# Patient Record
Sex: MALE | Race: WHITE | NOT HISPANIC OR LATINO | URBAN - METROPOLITAN AREA
[De-identification: names, ages, dates, MRNs, and addresses within clinical notes are randomized per-mention and may not be internally consistent; named-entity substitution may affect disease eponyms.]

---

## 2019-01-02 ENCOUNTER — OFFICE VISIT (OUTPATIENT)
Dept: URBAN - METROPOLITAN AREA CLINIC 32 | Facility: CLINIC | Age: 74
End: 2019-01-02
Payer: MEDICARE

## 2019-01-02 DIAGNOSIS — H40.1232 LOW-TENSION GLAUCOMA, BILATERAL, MODERATE STAGE: Primary | ICD-10-CM

## 2019-01-02 PROCEDURE — 92133 CPTRZD OPH DX IMG PST SGM ON: CPT | Performed by: OPTOMETRIST

## 2019-01-02 PROCEDURE — 99214 OFFICE O/P EST MOD 30 MIN: CPT | Performed by: OPTOMETRIST

## 2019-01-02 ASSESSMENT — INTRAOCULAR PRESSURE
OD: 10
OS: 13

## 2019-01-02 NOTE — IMPRESSION/PLAN
Impression: Diagnosis: Low-tension glaucoma, bilateral, moderate stage. Code: R34.2940. Plan: Intraocular pressure well controlled, tolerating medications. Will continue with same regimen.  OCT 72/70, enlarged blind spot OD, early arcuate defect OS stable

## 2020-01-28 ENCOUNTER — OFFICE VISIT (OUTPATIENT)
Dept: URBAN - METROPOLITAN AREA CLINIC 32 | Facility: CLINIC | Age: 75
End: 2020-01-28
Payer: MEDICARE

## 2020-01-28 PROCEDURE — 92014 COMPRE OPH EXAM EST PT 1/>: CPT | Performed by: OPTOMETRIST

## 2020-01-28 ASSESSMENT — INTRAOCULAR PRESSURE
OD: 12
OS: 12

## 2020-01-28 ASSESSMENT — KERATOMETRY
OD: 41.50
OS: 42.00

## 2020-01-28 NOTE — IMPRESSION/PLAN
Impression: Diagnosis: Low-tension glaucoma, bilateral, moderate stage. Code: H51.0704. Plan: Intraocular pressure well controlled, tolerating medications. Will continue with same regimen.  OCT 71/70, enlarged blind spot OD, early arcuate defect OS stable

## 2021-02-11 ENCOUNTER — OFFICE VISIT (OUTPATIENT)
Dept: URBAN - METROPOLITAN AREA CLINIC 32 | Facility: CLINIC | Age: 76
End: 2021-02-11
Payer: MEDICARE

## 2021-02-11 DIAGNOSIS — H25.11 AGE-RELATED NUCLEAR CATARACT, RIGHT EYE: ICD-10-CM

## 2021-02-11 PROCEDURE — 99214 OFFICE O/P EST MOD 30 MIN: CPT | Performed by: OPTOMETRIST

## 2021-02-11 ASSESSMENT — INTRAOCULAR PRESSURE
OD: 12
OS: 12

## 2021-02-11 ASSESSMENT — KERATOMETRY
OD: 40.75
OS: 42.13

## 2022-03-25 ENCOUNTER — OFFICE VISIT (OUTPATIENT)
Dept: URBAN - METROPOLITAN AREA CLINIC 32 | Facility: CLINIC | Age: 77
End: 2022-03-25
Payer: MEDICARE

## 2022-03-25 DIAGNOSIS — H52.03 HYPERMETROPIA, BILATERAL: ICD-10-CM

## 2022-03-25 PROCEDURE — 99213 OFFICE O/P EST LOW 20 MIN: CPT | Performed by: OPTOMETRIST

## 2022-03-25 ASSESSMENT — INTRAOCULAR PRESSURE
OS: 16
OD: 14

## 2022-03-25 ASSESSMENT — VISUAL ACUITY
OD: 20/20
OS: 20/20

## 2022-03-25 NOTE — IMPRESSION/PLAN
Impression: Diagnosis: Low-tension glaucoma, bilateral, moderate stage. Code: D66.6469. Plan: IOP: 14/16 Target IOP: Low to Mid Teens TMAX IOP: 20/20 Medications: Latanoprost QHS Family History: (+) Brother, Grandmother Patient education on condition and the potential of optic nerve damage. Continue same drop regimen. Recommend more frequent monitoring of condition. Patient insists on yearly monitoring.

## 2022-03-25 NOTE — IMPRESSION/PLAN
Impression: Hypermetropia, bilateral: H52.03. Plan: Finalized new glasses Rx. Patient education on appropriate options of eye glasses. Return to clinic in 1 year for complete eye exam and refraction. MS: AFlutter 70-99    (.-08-)    R-PVC    2.45 sec pause

## 2023-09-15 ENCOUNTER — OFFICE VISIT (OUTPATIENT)
Dept: URBAN - METROPOLITAN AREA CLINIC 32 | Facility: CLINIC | Age: 78
End: 2023-09-15
Payer: MEDICARE

## 2023-09-15 DIAGNOSIS — H40.1232 LOW-TENSION GLAUCOMA, BILATERAL, MODERATE STAGE: Primary | ICD-10-CM

## 2023-09-15 DIAGNOSIS — H52.03 HYPERMETROPIA, BILATERAL: ICD-10-CM

## 2023-09-15 PROCEDURE — 99214 OFFICE O/P EST MOD 30 MIN: CPT | Performed by: OPTOMETRIST

## 2023-09-15 PROCEDURE — 92083 EXTENDED VISUAL FIELD XM: CPT | Performed by: OPTOMETRIST

## 2023-09-15 PROCEDURE — 92133 CPTRZD OPH DX IMG PST SGM ON: CPT | Performed by: OPTOMETRIST

## 2023-09-15 ASSESSMENT — INTRAOCULAR PRESSURE
OS: 15
OD: 10

## 2024-03-15 ENCOUNTER — OFFICE VISIT (OUTPATIENT)
Dept: URBAN - METROPOLITAN AREA CLINIC 32 | Facility: CLINIC | Age: 79
End: 2024-03-15
Payer: MEDICARE

## 2024-03-15 DIAGNOSIS — H40.1232 LOW-TENSION GLAUCOMA, BILATERAL, MODERATE STAGE: Primary | ICD-10-CM

## 2024-03-15 PROCEDURE — 99214 OFFICE O/P EST MOD 30 MIN: CPT | Performed by: OPTOMETRIST

## 2024-03-15 PROCEDURE — 92083 EXTENDED VISUAL FIELD XM: CPT | Performed by: OPTOMETRIST

## 2024-03-15 PROCEDURE — 92133 CPTRZD OPH DX IMG PST SGM ON: CPT | Performed by: OPTOMETRIST

## 2024-03-15 ASSESSMENT — INTRAOCULAR PRESSURE
OD: 12
OS: 11

## 2024-09-04 ENCOUNTER — OFFICE VISIT (OUTPATIENT)
Dept: URBAN - METROPOLITAN AREA CLINIC 32 | Facility: CLINIC | Age: 79
End: 2024-09-04
Payer: MEDICARE

## 2024-09-04 DIAGNOSIS — H52.03 HYPERMETROPIA, BILATERAL: ICD-10-CM

## 2024-09-04 DIAGNOSIS — H40.1232 LOW-TENSION GLAUCOMA, BILATERAL, MODERATE STAGE: Primary | ICD-10-CM

## 2024-09-04 PROCEDURE — 92083 EXTENDED VISUAL FIELD XM: CPT | Performed by: OPTOMETRIST

## 2024-09-04 PROCEDURE — 92133 CPTRZD OPH DX IMG PST SGM ON: CPT | Performed by: OPTOMETRIST

## 2024-09-04 PROCEDURE — 99214 OFFICE O/P EST MOD 30 MIN: CPT | Performed by: OPTOMETRIST

## 2024-09-04 ASSESSMENT — INTRAOCULAR PRESSURE
OD: 9
OS: 10

## 2025-03-04 ENCOUNTER — OFFICE VISIT (OUTPATIENT)
Dept: URBAN - METROPOLITAN AREA CLINIC 32 | Facility: CLINIC | Age: 80
End: 2025-03-04
Payer: COMMERCIAL

## 2025-03-04 DIAGNOSIS — H40.1232 LOW-TENSION GLAUCOMA, BILATERAL, MODERATE STAGE: Primary | ICD-10-CM

## 2025-03-04 DIAGNOSIS — H52.13 MYOPIA: ICD-10-CM

## 2025-03-04 ASSESSMENT — INTRAOCULAR PRESSURE
OD: 11
OS: 12

## 2025-03-04 ASSESSMENT — VISUAL ACUITY
OD: 20/20
OS: 20/20